# Patient Record
Sex: MALE | Race: WHITE | NOT HISPANIC OR LATINO | URBAN - METROPOLITAN AREA
[De-identification: names, ages, dates, MRNs, and addresses within clinical notes are randomized per-mention and may not be internally consistent; named-entity substitution may affect disease eponyms.]

---

## 2024-06-25 ENCOUNTER — TELEPHONE (OUTPATIENT)
Dept: PRIMARY CARE | Facility: CLINIC | Age: 34
End: 2024-06-25

## 2024-06-25 DIAGNOSIS — R94.4 DECREASED GFR: ICD-10-CM

## 2024-06-25 DIAGNOSIS — Z00.00 ROUTINE GENERAL MEDICAL EXAMINATION AT A HEALTH CARE FACILITY: ICD-10-CM

## 2024-06-25 DIAGNOSIS — E66.3 OVERWEIGHT (BMI 25.0-29.9): Primary | ICD-10-CM

## 2024-07-23 ENCOUNTER — LAB (OUTPATIENT)
Dept: LAB | Facility: LAB | Age: 34
End: 2024-07-23
Payer: COMMERCIAL

## 2024-07-23 DIAGNOSIS — E66.3 OVERWEIGHT (BMI 25.0-29.9): ICD-10-CM

## 2024-07-23 DIAGNOSIS — R94.4 DECREASED GFR: ICD-10-CM

## 2024-07-23 DIAGNOSIS — Z00.00 ROUTINE GENERAL MEDICAL EXAMINATION AT A HEALTH CARE FACILITY: ICD-10-CM

## 2024-07-23 LAB
ALBUMIN SERPL-MCNC: 4.8 G/DL (ref 3.5–5)
ALP BLD-CCNC: 54 U/L (ref 35–125)
ALT SERPL-CCNC: 17 U/L (ref 5–40)
ANION GAP SERPL CALC-SCNC: 9 MMOL/L
APPEARANCE UR: CLEAR
AST SERPL-CCNC: 18 U/L (ref 5–40)
BILIRUB SERPL-MCNC: 0.6 MG/DL (ref 0.1–1.2)
BILIRUB UR STRIP.AUTO-MCNC: NEGATIVE MG/DL
BUN SERPL-MCNC: 12 MG/DL (ref 8–25)
CALCIUM SERPL-MCNC: 9.5 MG/DL (ref 8.5–10.4)
CHLORIDE SERPL-SCNC: 102 MMOL/L (ref 97–107)
CHOLEST SERPL-MCNC: 195 MG/DL (ref 133–200)
CHOLEST/HDLC SERPL: 3.4 {RATIO}
CO2 SERPL-SCNC: 26 MMOL/L (ref 24–31)
COLOR UR: NORMAL
CREAT SERPL-MCNC: 1 MG/DL (ref 0.4–1.6)
EGFRCR SERPLBLD CKD-EPI 2021: >90 ML/MIN/1.73M*2
GLUCOSE SERPL-MCNC: 114 MG/DL (ref 65–99)
GLUCOSE UR STRIP.AUTO-MCNC: NORMAL MG/DL
HDLC SERPL-MCNC: 58 MG/DL
KETONES UR STRIP.AUTO-MCNC: NEGATIVE MG/DL
LDLC SERPL CALC-MCNC: 128 MG/DL (ref 65–130)
LEUKOCYTE ESTERASE UR QL STRIP.AUTO: NEGATIVE
NITRITE UR QL STRIP.AUTO: NEGATIVE
PH UR STRIP.AUTO: 6.5 [PH]
POTASSIUM SERPL-SCNC: 4.5 MMOL/L (ref 3.4–5.1)
PROT SERPL-MCNC: 6.7 G/DL (ref 5.9–7.9)
PROT UR STRIP.AUTO-MCNC: NEGATIVE MG/DL
RBC # UR STRIP.AUTO: NEGATIVE /UL
SODIUM SERPL-SCNC: 137 MMOL/L (ref 133–145)
SP GR UR STRIP.AUTO: 1.01
TRIGL SERPL-MCNC: 44 MG/DL (ref 40–150)
UROBILINOGEN UR STRIP.AUTO-MCNC: NORMAL MG/DL

## 2024-07-23 PROCEDURE — 36415 COLL VENOUS BLD VENIPUNCTURE: CPT

## 2024-07-23 PROCEDURE — 80061 LIPID PANEL: CPT

## 2024-07-23 PROCEDURE — 80053 COMPREHEN METABOLIC PANEL: CPT

## 2024-07-23 PROCEDURE — 81003 URINALYSIS AUTO W/O SCOPE: CPT

## 2024-07-30 ENCOUNTER — APPOINTMENT (OUTPATIENT)
Dept: PRIMARY CARE | Facility: CLINIC | Age: 34
End: 2024-07-30

## 2024-07-30 VITALS
BODY MASS INDEX: 29.38 KG/M2 | SYSTOLIC BLOOD PRESSURE: 108 MMHG | OXYGEN SATURATION: 96 % | WEIGHT: 205.2 LBS | DIASTOLIC BLOOD PRESSURE: 74 MMHG | TEMPERATURE: 98 F | HEART RATE: 68 BPM | HEIGHT: 70 IN

## 2024-07-30 DIAGNOSIS — M25.551 RIGHT HIP PAIN: ICD-10-CM

## 2024-07-30 DIAGNOSIS — B35.1 ONYCHOMYCOSIS: ICD-10-CM

## 2024-07-30 DIAGNOSIS — Z30.09 VASECTOMY EVALUATION: ICD-10-CM

## 2024-07-30 DIAGNOSIS — R73.01 IFG (IMPAIRED FASTING GLUCOSE): ICD-10-CM

## 2024-07-30 DIAGNOSIS — Z00.00 WELL ADULT EXAM: Primary | ICD-10-CM

## 2024-07-30 DIAGNOSIS — M25.522 LEFT ELBOW PAIN: ICD-10-CM

## 2024-07-30 DIAGNOSIS — R73.01 ABNORMAL FASTING GLUCOSE: ICD-10-CM

## 2024-07-30 LAB — POC HEMOGLOBIN A1C: 5.1 % (ref 4.2–6.5)

## 2024-07-30 PROCEDURE — 1036F TOBACCO NON-USER: CPT | Performed by: FAMILY MEDICINE

## 2024-07-30 PROCEDURE — 3008F BODY MASS INDEX DOCD: CPT | Performed by: FAMILY MEDICINE

## 2024-07-30 PROCEDURE — 99395 PREV VISIT EST AGE 18-39: CPT | Performed by: FAMILY MEDICINE

## 2024-07-30 PROCEDURE — 83036 HEMOGLOBIN GLYCOSYLATED A1C: CPT | Performed by: FAMILY MEDICINE

## 2024-07-30 PROCEDURE — 99212 OFFICE O/P EST SF 10 MIN: CPT | Performed by: FAMILY MEDICINE

## 2024-07-30 ASSESSMENT — LIFESTYLE VARIABLES
HOW OFTEN DO YOU HAVE SIX OR MORE DRINKS ON ONE OCCASION: LESS THAN MONTHLY
HOW MANY STANDARD DRINKS CONTAINING ALCOHOL DO YOU HAVE ON A TYPICAL DAY: 1 OR 2
SKIP TO QUESTIONS 9-10: 0
AUDIT-C TOTAL SCORE: 2
HOW OFTEN DO YOU HAVE A DRINK CONTAINING ALCOHOL: MONTHLY OR LESS

## 2024-07-30 ASSESSMENT — ENCOUNTER SYMPTOMS
GASTROINTESTINAL NEGATIVE: 1
EYES NEGATIVE: 1
HEMATOLOGIC/LYMPHATIC NEGATIVE: 1
NEUROLOGICAL NEGATIVE: 1
RESPIRATORY NEGATIVE: 1
PSYCHIATRIC NEGATIVE: 1
CONSTITUTIONAL NEGATIVE: 1
ALLERGIC/IMMUNOLOGIC NEGATIVE: 1
ARTHRALGIAS: 1
ROS SKIN COMMENTS: TOENAIL CHANGES
CARDIOVASCULAR NEGATIVE: 1

## 2024-07-30 ASSESSMENT — PATIENT HEALTH QUESTIONNAIRE - PHQ9
1. LITTLE INTEREST OR PLEASURE IN DOING THINGS: NOT AT ALL
SUM OF ALL RESPONSES TO PHQ9 QUESTIONS 1 AND 2: 0
2. FEELING DOWN, DEPRESSED OR HOPELESS: NOT AT ALL

## 2024-07-30 NOTE — PROGRESS NOTES
"Subjective   Patient ID: Blanco Roach is a 33 y.o. male who presents for Annual Exam (Referral for podiatry - has fungus on left foot and would like a vasectomy /Right hip hurts - when sitting for a long time or movements x 1 year).    Tetanus and COVID-19 vaccinations are UTD.  Fasting labs completed.  He does not follow any particular diet.  He exercises 3-4 days a week.    1) IFG: new, no medication taken, eating more carbohydrates lately, exercises as stated above, HbA1C is 5.1%.    2) Left toenail fungal infection: chronic, has tried OTC medications without success, would like to see podiatry.   3) Vasectomy: patient would like to consult a urologist about having a vasectomy.  4) Right hip pain: began 1 year ago, pain occurs after prolonged sitting and with prolonged physical activity, right hip flares out when resting.    5) Left elbow pain: occurs after hockey or other activities that involve repetitive movements of the elbow, hears a grinding in the elbow, denies swelling, calor, and erythema, wears a compression elbow sleeve when playing which helps.      Review of Systems   Constitutional: Negative.    HENT: Negative.     Eyes: Negative.    Respiratory: Negative.     Cardiovascular: Negative.    Gastrointestinal: Negative.    Genitourinary: Negative.    Musculoskeletal:  Positive for arthralgias (right hip).   Skin:         Toenail changes   Allergic/Immunologic: Negative.    Neurological: Negative.    Hematological: Negative.    Psychiatric/Behavioral: Negative.       Objective   /74 (BP Location: Left arm, Patient Position: Sitting, BP Cuff Size: Adult)   Pulse 68   Temp 36.7 °C (98 °F) (Temporal)   Ht 1.778 m (5' 10\")   Wt 93.1 kg (205 lb 3.2 oz)   SpO2 96%   BMI 29.44 kg/m²     Physical Exam  Vitals reviewed.   Constitutional:       Appearance: Normal appearance.   HENT:      Head: Normocephalic and atraumatic.      Right Ear: Tympanic membrane, ear canal and external ear normal.      " Left Ear: Tympanic membrane, ear canal and external ear normal.      Nose: Nose normal.      Mouth/Throat:      Mouth: Mucous membranes are moist.      Pharynx: Oropharynx is clear.   Eyes:      Extraocular Movements: Extraocular movements intact.      Conjunctiva/sclera: Conjunctivae normal.      Pupils: Pupils are equal, round, and reactive to light.   Neck:      Vascular: No carotid bruit.      Comments: No thyromegaly  Cardiovascular:      Rate and Rhythm: Normal rate and regular rhythm.      Pulses: Normal pulses.      Heart sounds: Normal heart sounds.   Pulmonary:      Effort: Pulmonary effort is normal.      Breath sounds: Normal breath sounds.   Abdominal:      General: Bowel sounds are normal.      Palpations: Abdomen is soft.   Musculoskeletal:         General: Normal range of motion.      Cervical back: Normal range of motion and neck supple.      Comments: Right hip: non tender, restricted internal rotation; Left elbow: normal exam, no tenderness or crepitus.   Skin:     General: Skin is warm and dry.      Capillary Refill: Capillary refill takes less than 2 seconds.   Neurological:      General: No focal deficit present.      Mental Status: He is alert and oriented to person, place, and time.   Psychiatric:         Mood and Affect: Mood normal.         Behavior: Behavior normal.     Assessment/Plan   Diagnoses and all orders for this visit:  Well adult exam  PE completed.  Tetanus and COVID-19 vaccinations UTD.    Fasting labs completed.    Healthy diet.  Regular exercise.  Manage weight.  Repeat exam in 1 year.    IFG (impaired fasting glucose)  New.  Decrease dietary carb intake by 1/3.  Regular exercise.  Manage weight.  Follow up in 1 year.   Abnormal fasting glucose  POCT glycosylated hemoglobin (Hb A1C) 5.1%.  Onychomycosis  Chronic.  Failed OTC treatments.    Referral to Podiatry  Await input.  Follow up as directed.    Vasectomy evaluation  Referral to Urology  Await input.  Follow up as  directed.    Right hip pain  Chronic.  No x-ray needed at this time.  Referral to Physical Therapy  Await input.  Follow up as directed.  Left elbow pain  New.  Occurs after repeated elbow activities.  Wear compression elbow sleeve when physically active.  Ice after activity.  OTC NSAID if needed.  Follow up if not better.

## 2024-08-09 ENCOUNTER — EVALUATION (OUTPATIENT)
Dept: PHYSICAL THERAPY | Facility: CLINIC | Age: 34
End: 2024-08-09
Payer: COMMERCIAL

## 2024-08-09 DIAGNOSIS — M25.551 RIGHT HIP PAIN: Primary | ICD-10-CM

## 2024-08-09 PROCEDURE — 97140 MANUAL THERAPY 1/> REGIONS: CPT | Mod: GP

## 2024-08-09 PROCEDURE — 97110 THERAPEUTIC EXERCISES: CPT | Mod: GP

## 2024-08-09 PROCEDURE — 97161 PT EVAL LOW COMPLEX 20 MIN: CPT | Mod: GP

## 2024-08-09 ASSESSMENT — ENCOUNTER SYMPTOMS
OCCASIONAL FEELINGS OF UNSTEADINESS: 0
LOSS OF SENSATION IN FEET: 0
DEPRESSION: 0

## 2024-08-09 ASSESSMENT — PAIN DESCRIPTION - DESCRIPTORS: DESCRIPTORS: ACHING

## 2024-08-09 ASSESSMENT — PAIN - FUNCTIONAL ASSESSMENT: PAIN_FUNCTIONAL_ASSESSMENT: 0-10

## 2024-08-09 ASSESSMENT — PAIN SCALES - GENERAL: PAINLEVEL_OUTOF10: 3

## 2024-08-09 NOTE — PROGRESS NOTES
"    Physical Therapy  Physical Therapy Evaluation    Patient Name: Blanco Roach \"Eldon\"  MRN: [id number]  Today's Date: 8/9/2024  Time Calculation  Start Time: 1530  Stop Time: 1617  Time Calculation (min): 47 min    Insurance:  Visit number: 1  Insurance Type: requires no auth    General  Reason for visit: General  Reason for Referral: R hip pain  General Comment: pt reports straining his Right hamstring 4-5 years agao, recently he noticed that his hip is opened up more and he feels tightness on the outside of glutes, pain is most noticable when sports and recreational activities are finished, it hurts most when he is sleeping    Current Problem  1. Right hip pain  Referral to Physical Therapy    Follow Up In Physical Therapy          Assessment:    Pt presents with later glute pain that is worse following high level activities.  He has most difficulty with sleeping following activities.  He has mild strength and AROM deficits in the hips and PT special tests and measures suggest mild JACKIE leading to increased work of the glutes to maintain RLE externally rotated during mobility and sports.  He would benefit from skilled therapy to allow for improved motion, flexibility, and strength to allow for max participation in his desired sports    Plan:   Treatment/Interventions: Cryotherapy, Dry needling, Education/ Instruction, Electrical stimulation, Manual therapy, Neuromuscular re-education, Self care/ home management, Taping techniques, Therapeutic activities, Therapeutic exercises  PT Frequency: 1 time per week  Duration: 12-16 weeks  Onset Date: 07/30/24    Precautions:   Precautions  Precautions Comment: none    Medical History Form: Reviewed (scanned into chart)    Subjective:   Onset: Onset Date: 07/30/24  JAME: Overuse     Current Condition since injury:   same     Social Determinants of health: No    PAIN  Pain Assessment: 0-10  0-10 (Numeric) Pain Score: 3  Pain Type: Acute pain  Pain Location: Hip  Pain " Orientation: Right  Pain Descriptors: Aching  Aggravating Factors: sports  Relieving Factors: Rest     Relevant Information (PMH & Previous Tests/Imaging): none  Previous Interventions/Treatments: None     Prior Level of Function (PLOF)  Exercise/Physical Activity: skiing, sports,   Work/School: engineering at [employer]  Current ADL/IADL Status: independent     Patients Living Environment: Reviewed and no concern    Primary Language: English    Pt goals for therapy: to improve pain and restore full sport function  Red Flags:   REVIEW OF SYSTEMS/ RED FLAGS  (-) osteoporosis  (-) Cough/ Sneeze   (-) balance difficulties/FALLS   (-) numbness/tingle  (-) weakness  (+) unremitting night pain   Sleep position:  (-) AM Stiffness           (-) Unexplained Wt. Loss  (-) h/o CA   (-) Pacemaker  (-) Bowel/Bladder            (-) saddle paresthesia    Objective:  FLOW SHEET  Hip Functional Rating Scale  LEFS   /80: 74/80    Hip Palpation/Joint Mobility Assessment  Palpation / Joint Mobility Comment: TTP posterior glute med, hypertonicity piriformis    Lumbar AROM  Lumbar AROM WFL: yes    Hip AROM  R hip flexion: (125°): 90  L hip flexion: (125°): 90  R hip abduction: (45°): 45  L hip abduction: (45°): 45  R hip extension: (10°): 0  L hip extension: (10°): 10  R hip ER: (45°): 50  L hip ER: (45°): 50  R hip IR: (45°): 30  L hip IR: (45°): 40    Specific Lower Extremity MMT  R Iliopsoas: (5/5): 5/5  L Iliopsoas: (5/5): 5/5  R Gluteals (prone): (5/5): 4/5  L Gluteals (prone): (5/5): 4/5  R Gluteals (sidelying): (5/5): 4-/5  L Gluteals (sidelying): (5/5): 4-/5  R knee flexion: (5/5): 5/5  L knee flexion: (5/5): 5/5  R knee extension: (5/5): 5/5  L knee extension: (5/5): 5/5    Special Tests  ARACELI: (Negative): neg b/l  Other: scour neg b/l, FADDIR pos R    Flexibility  R hamstrings: -45  L hamstrings: -20      EDUCATION: home exercise program, plan of care, activity modifications, pain management, and injury pathology        Goals:  Active       PT Problem       demo HEP w/ at least 75% accuracy in order signify understanding of HEP for improvement of hip flexibility        Start:  08/09/24    Expected End:  11/07/24            demonstrate 2/3 a muscle grade strength increase in  side glutes in order to complete sport activities        Start:  08/09/24    Expected End:  11/07/24            Pt will improve LEFS score by >78/80 to demonstrate in order to show increased functional mobility        Start:  08/09/24    Expected End:  11/07/24            report GROC +3 or greater (MCID) in order to show self perceived improvement with therapy        Start:  08/09/24    Expected End:  11/07/24            report 25% decrease in pain intensity in order to sleep better       Start:  08/09/24    Expected End:  11/07/24                Plan of care was developed with input and agreement by the patient    Potential to achieve goals:  Good    Treatments:   This therapist instructed and demonstrated interventions to patient, patient completed the following under direct supervision of this therapist:  Therapeutic Exercise:   min  15 MINUTES  Therapeutic Exercise  Therapeutic Exercise Activity 1: clamshells red TB  Therapeutic Exercise Activity 2: glute bridges iwht hip adduction ISO holds  Therapeutic Exercise Activity 3: glute bridges with hip adduction ISO hold  Therapeutic Exercise Activity 4: quadruped hip extension  Therapeutic Exercise Activity 5: self muscle release with ball/wall  Manual Therapy:       12 MINUTES  Manual Therapy  Manual Therapy Activity 1: MET for inominate rotiation  Manual Therapy Activity 2: STM to glutes, side glutes at TrPs and piroformis      PT Evaluation Time Entry  PT Evaluation (Low) Time Entry: 20    Davonte Frausto, PT    Access Code: [email or web address]  URL: [email or web address]  Date: 08/09/2024  Prepared by: Davonte Frausto    Exercises  - Supine Bridge with Resistance Band  - 2 x daily - 7 x  weekly - 3 sets - 20-30 reps  - Supine Bridge with Mini Swiss Ball Between Knees  - 2 x daily - 7 x weekly - 3 sets - 20-30 reps  - Clamshell with Resistance  - 2 x daily - 7 x weekly - 3 sets - 10-15 reps  - SELF MASSAGE BALL - LATERAL HIP - IT BAND - WALL  - 2 x daily - 7 x weekly - 3 sets - 20-30 reps  - Quadruped Alternating Leg Extensions  - 2 x daily - 7 x weekly - 3 sets - 10-20 reps

## 2024-08-09 NOTE — Clinical Note
August 9, 2024    Davonte Frausto, PT  [address] [facility] [address]  [place] [address]    Patient: Eldon Roach   YOB: 1990   Date of Visit: 8/9/2024       Dear Roseann Shepherd DO  [address] [place] [facility]  [address]  [place] [address]    The attached plan of care is being sent to you because your patient’s medical reimbursement requires that you certify the plan of care. Your signature is required to allow uninterrupted insurance coverage.      You may indicate your approval by signing below and faxing this form back to us at Dept Fax: [phone].    Please call Dept: [phone] with any questions or concerns.    Thank you for this referral,        Davonte Frausto, PT  [facility]  [address] [facility] [place] [address]    Payer: Payor: [employer] / [facility] / Product Type: *No Product type* /                                                                         Date:     Dear Davonte Frausto, PT,     Re: Mr. Blanco Roach, MRN:[id number]    I certify that I have reviewed the attached plan of care and it is medically necessary for Mr. Blanco Roach (1990) who is under my care.          ______________________________________                    _________________  Provider name and credentials                                           Date and time                                                                                           Plan of Care 8/9/24   Effective from: 8/9/2024  Effective to: 11/7/2024    Plan ID: [id number]            Participants as of Finalize on 8/9/2024    Name Type Comments Contact Info    Roseann Shepherd DO PCP - General  [phone]    Davonte Frausto PT Physical Therapist  [phone]       Last Plan Note     Author: Davonte Frausto PT Status: Incomplete Last edited: 8/9/2024  3:30 PM           Physical Therapy  Physical Therapy Evaluation    Patient Name: Blanco Roach  "\"Eldon\"  MRN: [id number]  Today's Date: 8/9/2024  Time Calculation  Start Time: 1530  Stop Time: 1617  Time Calculation (min): 47 min    Insurance:  Visit number: 1  Insurance Type: requires no auth    General  Reason for visit: General  Reason for Referral: R hip pain  General Comment: pt reports straining his Right hamstring 4-5 years agao, recently he noticed that his hip is opened up more and he feels tightness on the outside of glutes, pain is most noticable when sports and recreational activities are finished, it hurts most when he is sleeping    Current Problem  1. Right hip pain  Referral to Physical Therapy    Follow Up In Physical Therapy          Assessment:    Pt presents with later glute pain that is worse following high level activities.  He has most difficulty with sleeping following activities.  He has mild strength and AROM deficits in the hips and PT special tests and measures suggest mild JACKIE leading to increased work of the glutes to maintain RLE externally rotated during mobility and sports.  He would benefit from skilled therapy to allow for improved motion, flexibility, and strength to allow for max participation in his desired sports    Plan:   Treatment/Interventions: Cryotherapy, Dry needling, Education/ Instruction, Electrical stimulation, Manual therapy, Neuromuscular re-education, Self care/ home management, Taping techniques, Therapeutic activities, Therapeutic exercises    Precautions:   Precautions  Precautions Comment: none    Medical History Form: Reviewed (scanned into chart)    Subjective:   Onset:    JAME: Overuse     Current Condition since injury:   same     Social Determinants of health: No    PAIN  Pain Assessment: 0-10  0-10 (Numeric) Pain Score: 3  Pain Type: Acute pain  Pain Location: Hip  Pain Orientation: Right  Pain Descriptors: Aching  Aggravating Factors: sports  Relieving Factors: Rest     Relevant Information (PMH & Previous Tests/Imaging): none  Previous " Interventions/Treatments: None     Prior Level of Function (PLOF)  Exercise/Physical Activity: skiing, sports,   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 Loss  (-) h/o CA   (-) Pacemaker  (-) Bowel/Bladder            (-) saddle paresthesia    Objective:  FLOW SHEET  Hip Functional Rating Scale  LEFS   /80: 74/80    Hip Palpation/Joint Mobility Assessment  Palpation / Joint Mobility Comment: TTP posterior glute med, hypertonicity piriformis    Lumbar AROM  Lumbar AROM WFL: yes    Hip AROM  R hip flexion: (125°): 90  L hip flexion: (125°): 90  R hip abduction: (45°): 45  L hip abduction: (45°): 45  R hip extension: (10°): 0  L hip extension: (10°): 10  R hip ER: (45°): 50  L hip ER: (45°): 50  R hip IR: (45°): 30  L hip IR: (45°): 40    Specific Lower Extremity MMT  R Iliopsoas: (5/5): 5/5  L Iliopsoas: (5/5): 5/5  R Gluteals (prone): (5/5): 4/5  L Gluteals (prone): (5/5): 4/5  R Gluteals (sidelying): (5/5): 4-/5  L Gluteals (sidelying): (5/5): 4-/5  R knee flexion: (5/5): 5/5  L knee flexion: (5/5): 5/5  R knee extension: (5/5): 5/5  L knee extension: (5/5): 5/5    Special Tests  ARACELI: (Negative): neg b/l  Other: scour neg b/l, FADDIR pos R    Flexibility  R hamstrings: -45  L hamstrings: -20      EDUCATION: home exercise program, plan of care, activity modifications, pain management, and injury pathology       Goals:      Plan of care was developed with input and agreement by the patient    Potential to achieve goals:  Good    Treatments:   This therapist instructed and demonstrated  interventions to patient, patient completed the following under direct supervision of this therapist:  Therapeutic Exercise:   min  15 MINUTES  Therapeutic Exercise  Therapeutic Exercise Activity 1: clamshells red TB  Therapeutic Exercise Activity 2: glute bridges iwht hip adduction ISO holds  Therapeutic Exercise Activity 3: glute bridges with hip adduction ISO hold  Therapeutic Exercise Activity 4: quadruped hip extension  Therapeutic Exercise Activity 5: self muscle release with ball/wall  Manual Therapy:       12 MINUTES  Manual Therapy  Manual Therapy Activity 1: MET for inominate rotiation  Manual Therapy Activity 2: STM to glutes, side glutes at TrPs and piroformis      PT Evaluation Time Entry  PT Evaluation (Low) Time Entry: 20    Davonte Frausto, PT    Access Code: [email or web address]  URL: [email or web address]  Date: 08/09/2024  Prepared by: Davonte Frausto    Exercises  - Supine Bridge with Resistance Band  - 2 x daily - 7 x weekly - 3 sets - 20-30 reps  - Supine Bridge with Mini Swiss Ball Between Knees  - 2 x daily - 7 x weekly - 3 sets - 20-30 reps  - Clamshell with Resistance  - 2 x daily - 7 x weekly - 3 sets - 10-15 reps  - SELF MASSAGE BALL - LATERAL HIP - IT BAND - WALL  - 2 x daily - 7 x weekly - 3 sets - 20-30 reps  - Quadruped Alternating Leg Extensions  - 2 x daily - 7 x weekly - 3 sets - 10-20 reps         Current Participants as of 8/9/2024    Name Type Comments Contact Info    Roseann Shepherd DO PCP - General  [phone]    Signature pending    Davonte Frausto, PT Physical Therapist  [phone]    Electronically signed by Davonte Frausto PT at 8/9/2024 1641 EDT

## 2024-09-10 ENCOUNTER — APPOINTMENT (OUTPATIENT)
Dept: UROLOGY | Facility: CLINIC | Age: 34
End: 2024-09-10
Payer: COMMERCIAL

## 2024-09-10 DIAGNOSIS — Z30.09 VASECTOMY EVALUATION: Primary | ICD-10-CM

## 2024-09-10 PROBLEM — J02.9 ACUTE PHARYNGITIS: Status: ACTIVE | Noted: 2021-02-08

## 2024-09-10 PROCEDURE — 99203 OFFICE O/P NEW LOW 30 MIN: CPT | Performed by: SURGERY

## 2024-09-10 PROCEDURE — 1036F TOBACCO NON-USER: CPT | Performed by: SURGERY

## 2024-09-10 ASSESSMENT — PAIN SCALES - GENERAL: PAINLEVEL: 0-NO PAIN

## 2024-09-10 NOTE — PROGRESS NOTES
"Subjective   Patient ID: Blanco Roach \"Eldon\" is a 34 y.o. male who presents for Advice Only (Vasectomy).    HPI  He is interested in getting a vasectomy.  He currently has 2 children, both healthy.  His wife is currently on birth control.  She would like to discontinue this.  He is otherwise healthy.  He has not had any prior surgeries.      Review of Systems  As per HPI, remaining 10 systems negative        Objective   Physical Exam  Well nourished  Abdomen soft, ND, NT  No hernias  Circumcised, patent meatus, no lesions  Bilateral descended testes, no masses  Bilateral vasa palpable            Assessment/Plan   Problem List Items Addressed This Visit    None  Visit Diagnoses         Codes    Vasectomy evaluation    -  Primary Z30.09    Relevant Orders    Vasectomy               I counseled him on the procedure of vasectomy.  We discussed the risks including bleeding, infection, post procedure pain, and failure.  I did inform him that he would need a postvasectomy semen analysis done about 2 to 3 months after the procedure.  This would be to ensure procedure success.  He understands this and wishes to proceed.    We will schedule him for a vasectomy.          Karen Briones MD 09/10/24 8:15 AM   "

## 2024-12-10 PROBLEM — J02.9 ACUTE PHARYNGITIS: Status: RESOLVED | Noted: 2021-02-08 | Resolved: 2024-12-10

## 2024-12-12 DIAGNOSIS — Z79.899 ENCOUNTER FOR LONG-TERM (CURRENT) USE OF MEDICATIONS: Primary | ICD-10-CM

## 2025-04-14 PROBLEM — M25.551 RIGHT HIP PAIN: Status: RESOLVED | Noted: 2024-07-30 | Resolved: 2025-04-14

## 2025-04-14 PROBLEM — J02.9 SORE THROAT: Status: RESOLVED | Noted: 2021-02-08 | Resolved: 2025-04-14

## 2025-04-14 PROBLEM — M25.522 LEFT ELBOW PAIN: Status: RESOLVED | Noted: 2024-07-30 | Resolved: 2025-04-14

## 2025-04-14 PROBLEM — R05.9 COUGH: Status: RESOLVED | Noted: 2021-02-08 | Resolved: 2025-04-14

## 2025-04-14 PROBLEM — E66.3 OVERWEIGHT: Status: ACTIVE | Noted: 2019-02-02

## 2025-04-14 PROBLEM — R79.89 ABNORMAL LIVER FUNCTION TESTS: Status: RESOLVED | Noted: 2019-02-02 | Resolved: 2025-04-14

## 2025-07-31 ENCOUNTER — APPOINTMENT (OUTPATIENT)
Dept: PRIMARY CARE | Facility: CLINIC | Age: 35
End: 2025-07-31
Payer: COMMERCIAL

## 2025-07-31 VITALS
TEMPERATURE: 97.4 F | BODY MASS INDEX: 28.49 KG/M2 | WEIGHT: 199 LBS | HEIGHT: 70 IN | SYSTOLIC BLOOD PRESSURE: 111 MMHG | OXYGEN SATURATION: 96 % | HEART RATE: 82 BPM | DIASTOLIC BLOOD PRESSURE: 74 MMHG

## 2025-07-31 DIAGNOSIS — B35.1 ONYCHOMYCOSIS: ICD-10-CM

## 2025-07-31 DIAGNOSIS — Z00.00 ANNUAL PHYSICAL EXAM: Primary | ICD-10-CM

## 2025-07-31 DIAGNOSIS — R73.01 IFG (IMPAIRED FASTING GLUCOSE): ICD-10-CM

## 2025-07-31 PROCEDURE — 99395 PREV VISIT EST AGE 18-39: CPT | Performed by: FAMILY MEDICINE

## 2025-07-31 PROCEDURE — 3008F BODY MASS INDEX DOCD: CPT | Performed by: FAMILY MEDICINE

## 2025-07-31 PROCEDURE — 99212 OFFICE O/P EST SF 10 MIN: CPT | Performed by: FAMILY MEDICINE

## 2025-07-31 ASSESSMENT — COLUMBIA-SUICIDE SEVERITY RATING SCALE - C-SSRS
6. HAVE YOU EVER DONE ANYTHING, STARTED TO DO ANYTHING, OR PREPARED TO DO ANYTHING TO END YOUR LIFE?: NO
1. IN THE PAST MONTH, HAVE YOU WISHED YOU WERE DEAD OR WISHED YOU COULD GO TO SLEEP AND NOT WAKE UP?: NO
2. HAVE YOU ACTUALLY HAD ANY THOUGHTS OF KILLING YOURSELF?: NO

## 2025-07-31 ASSESSMENT — ENCOUNTER SYMPTOMS
RESPIRATORY NEGATIVE: 1
CARDIOVASCULAR NEGATIVE: 1
EYES NEGATIVE: 1
PSYCHIATRIC NEGATIVE: 1
HEMATOLOGIC/LYMPHATIC NEGATIVE: 1
GASTROINTESTINAL NEGATIVE: 1
ALLERGIC/IMMUNOLOGIC NEGATIVE: 1
CONSTITUTIONAL NEGATIVE: 1
MUSCULOSKELETAL NEGATIVE: 1
NEUROLOGICAL NEGATIVE: 1

## 2025-07-31 ASSESSMENT — LIFESTYLE VARIABLES
SKIP TO QUESTIONS 9-10: 0
AUDIT-C TOTAL SCORE: 2
HOW MANY STANDARD DRINKS CONTAINING ALCOHOL DO YOU HAVE ON A TYPICAL DAY: 1 OR 2
HOW OFTEN DO YOU HAVE A DRINK CONTAINING ALCOHOL: MONTHLY OR LESS
HOW OFTEN DO YOU HAVE SIX OR MORE DRINKS ON ONE OCCASION: LESS THAN MONTHLY

## 2025-07-31 ASSESSMENT — PATIENT HEALTH QUESTIONNAIRE - PHQ9
SUM OF ALL RESPONSES TO PHQ9 QUESTIONS 1 AND 2: 0
2. FEELING DOWN, DEPRESSED OR HOPELESS: NOT AT ALL
1. LITTLE INTEREST OR PLEASURE IN DOING THINGS: NOT AT ALL

## 2025-07-31 NOTE — PROGRESS NOTES
"Subjective   Patient ID: Eldon Roach is a 34 y.o. male who presents for Annual Exam.    Tetanus and COVID-19 vaccinations are UTD.  Fasting labs were not completed.    1) IFG: current status unknown, last HbA1C was 5.1%, no medication taken, tries to follow a low carb diet, exercises 2-3 days a week.  2) Left toenail fungal infection: chronic, will be resuming antifungal oral treatment soon, under the care of podiatry (Dr. Wall).    Review of Systems   Constitutional: Negative.    HENT: Negative.     Eyes: Negative.    Respiratory: Negative.     Cardiovascular: Negative.    Gastrointestinal: Negative.    Genitourinary: Negative.    Musculoskeletal: Negative.    Skin: Negative.    Allergic/Immunologic: Negative.    Neurological: Negative.    Hematological: Negative.    Psychiatric/Behavioral: Negative.       Objective   /74 (BP Location: Left arm, Patient Position: Sitting, BP Cuff Size: Adult)   Pulse 82   Temp 36.3 °C (97.4 °F) (Temporal)   Ht 1.778 m (5' 10\")   Wt 90.3 kg (199 lb)   SpO2 96%   BMI 28.55 kg/m²     Physical Exam  Vitals reviewed.   Constitutional:       General: He is not in acute distress.     Appearance: Normal appearance. He is not ill-appearing.   HENT:      Head: Normocephalic and atraumatic.      Right Ear: Tympanic membrane, ear canal and external ear normal.      Left Ear: Tympanic membrane, ear canal and external ear normal.      Nose: Nose normal.      Mouth/Throat:      Mouth: Mucous membranes are moist.      Pharynx: Oropharynx is clear.     Eyes:      Extraocular Movements: Extraocular movements intact.      Conjunctiva/sclera: Conjunctivae normal.      Pupils: Pupils are equal, round, and reactive to light.     Neck:      Vascular: No carotid bruit.      Comments: No thyromegaly  Cardiovascular:      Rate and Rhythm: Normal rate and regular rhythm.      Pulses: Normal pulses.           Radial pulses are 2+ on the right side and 2+ on the left side.        Dorsalis pedis " pulses are 2+ on the right side and 2+ on the left side.        Posterior tibial pulses are 2+ on the right side and 2+ on the left side.      Heart sounds: Normal heart sounds.   Pulmonary:      Effort: Pulmonary effort is normal.      Breath sounds: Normal breath sounds.   Abdominal:      General: Bowel sounds are normal.      Palpations: Abdomen is soft.     Musculoskeletal:         General: Normal range of motion.      Cervical back: Normal range of motion and neck supple.      Right lower leg: No edema.      Left lower leg: No edema.      Comments: 5/5 muscle strength x 4 extremities   Lymphadenopathy:      Cervical: No cervical adenopathy.     Skin:     General: Skin is warm and dry.      Capillary Refill: Capillary refill takes less than 2 seconds.     Neurological:      General: No focal deficit present.      Mental Status: He is alert and oriented to person, place, and time.      Deep Tendon Reflexes:      Reflex Scores:       Brachioradialis reflexes are 2+ on the right side and 2+ on the left side.       Patellar reflexes are 2+ on the right side and 2+ on the left side.    Psychiatric:         Mood and Affect: Mood normal.         Behavior: Behavior normal.     Assessment/Plan   Diagnoses and all orders for this visit:  Annual physical exam  PE completed.  Tetanus and COVID-19 vaccinations UTD.  Healthy diet.  Regular exercise.  Will complete fasting labs soon.  Will notify with results.  Repeat exam in 1 year.   IFG (impaired fasting glucose)  Current status unknown  Low carb diet.  Regular exercise.  Manage weight.  Follow up in 1 year.   Onychomycosis  Current  Resume treatment as directed.  Keep follow-up appointments with podiatry.  Await input.  Follow up as directed.    Other orders  -     Follow Up In Primary Care - Health Maintenance  -     Follow Up In Primary Care - Health Maintenance; Future

## 2026-08-06 ENCOUNTER — APPOINTMENT (OUTPATIENT)
Dept: PRIMARY CARE | Facility: CLINIC | Age: 36
End: 2026-08-06
Payer: COMMERCIAL